# Patient Record
(demographics unavailable — no encounter records)

---
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
## 2025-06-27 NOTE — ASSESSMENT
[FreeTextEntry1] : Mobic prescribed for NSAID- medication precautions discussed OA will tend to worsen over time, may need replacements in future

## 2025-06-27 NOTE — IMAGING
[AP] : anteroposterior [Right] : right femur [FreeTextEntry1] : subtle 4/5 listhesis, chronic L1 wedging [de-identified] : moderate OA both hips, right with troch nail [de-identified] : short troch nail with healed fx, heterotopic ossification, moderate superior joint narrowing